# Patient Record
Sex: FEMALE | Race: WHITE
[De-identification: names, ages, dates, MRNs, and addresses within clinical notes are randomized per-mention and may not be internally consistent; named-entity substitution may affect disease eponyms.]

---

## 2019-06-08 ENCOUNTER — HOSPITAL ENCOUNTER (EMERGENCY)
Dept: HOSPITAL 91 - FTE | Age: 24
Discharge: HOME | End: 2019-06-08
Payer: MEDICAID

## 2019-06-08 ENCOUNTER — HOSPITAL ENCOUNTER (EMERGENCY)
Dept: HOSPITAL 10 - FTE | Age: 24
Discharge: HOME | End: 2019-06-08
Payer: MEDICAID

## 2019-06-08 VITALS — HEART RATE: 68 BPM | SYSTOLIC BLOOD PRESSURE: 123 MMHG | RESPIRATION RATE: 18 BRPM | DIASTOLIC BLOOD PRESSURE: 58 MMHG

## 2019-06-08 VITALS
BODY MASS INDEX: 27.48 KG/M2 | WEIGHT: 160.94 LBS | BODY MASS INDEX: 27.48 KG/M2 | HEIGHT: 64 IN | HEIGHT: 64 IN | WEIGHT: 160.94 LBS

## 2019-06-08 DIAGNOSIS — O26.891: Primary | ICD-10-CM

## 2019-06-08 DIAGNOSIS — R10.2: ICD-10-CM

## 2019-06-08 DIAGNOSIS — Z3A.01: ICD-10-CM

## 2019-06-08 LAB
ADD MAN DIFF?: NO
ADD UMIC: YES
BASOPHIL #: 0 10^3/UL (ref 0–0.1)
BASOPHILS %: 0.4 % (ref 0–2)
EOSINOPHILS #: 0.3 10^3/UL (ref 0–0.5)
EOSINOPHILS %: 3 % (ref 0–7)
HEMATOCRIT: 36.6 % (ref 37–47)
HEMOGLOBIN: 12.5 G/DL (ref 12–16)
IMMATURE GRANS #M: 0.02 10^3/UL (ref 0–0.03)
IMMATURE GRANS % (M): 0.2 % (ref 0–0.43)
LYMPHOCYTES #: 2.1 10^3/UL (ref 0.8–2.9)
LYMPHOCYTES %: 24.9 % (ref 15–51)
MEAN CORPUSCULAR HEMOGLOBIN: 30 PG (ref 29–33)
MEAN CORPUSCULAR HGB CONC: 34.2 G/DL (ref 32–37)
MEAN CORPUSCULAR VOLUME: 87.8 FL (ref 82–101)
MEAN PLATELET VOLUME: 9.9 FL (ref 7.4–10.4)
MONOCYTE #: 0.4 10^3/UL (ref 0.3–0.9)
MONOCYTES %: 4.9 % (ref 0–11)
NEUTROPHIL #: 5.7 10^3/UL (ref 1.6–7.5)
NEUTROPHILS %: 66.6 % (ref 39–77)
NUCLEATED RED BLOOD CELLS #: 0 10^3/UL (ref 0–0)
NUCLEATED RED BLOOD CELLS%: 0 /100WBC (ref 0–0)
PLATELET COUNT: 261 10^3/UL (ref 140–415)
RED BLOOD COUNT: 4.17 10^6/UL (ref 4.2–5.4)
RED CELL DISTRIBUTION WIDTH: 12.9 % (ref 11.5–14.5)
UR ASCORBIC ACID: NEGATIVE MG/DL
UR BILIRUBIN (DIP): NEGATIVE MG/DL
UR BLOOD (DIP): (no result) MG/DL
UR CLARITY: (no result)
UR COLOR: YELLOW
UR GLUCOSE (DIP): NEGATIVE MG/DL
UR KETONES (DIP): NEGATIVE MG/DL
UR LEUKOCYTE ESTERASE (DIP): NEGATIVE LEU/UL
UR NITRITE (DIP): NEGATIVE MG/DL
UR PH (DIP): 5 (ref 5–9)
UR RBC: 2 /HPF (ref 0–5)
UR SPECIFIC GRAVITY (DIP): 1.01 (ref 1–1.03)
UR SQUAMOUS EPITHELIAL CELL: (no result) /HPF
UR TOTAL PROTEIN (DIP): NEGATIVE MG/DL
UR UROBILINOGEN (DIP): NEGATIVE MG/DL
UR WBC: 1 /HPF (ref 0–5)
WHITE BLOOD COUNT: 8.6 10^3/UL (ref 4.8–10.8)

## 2019-06-08 PROCEDURE — 85025 COMPLETE CBC W/AUTO DIFF WBC: CPT

## 2019-06-08 PROCEDURE — 81025 URINE PREGNANCY TEST: CPT

## 2019-06-08 PROCEDURE — 76801 OB US < 14 WKS SINGLE FETUS: CPT

## 2019-06-08 PROCEDURE — 36415 COLL VENOUS BLD VENIPUNCTURE: CPT

## 2019-06-08 PROCEDURE — 84702 CHORIONIC GONADOTROPIN TEST: CPT

## 2019-06-08 PROCEDURE — 99284 EMERGENCY DEPT VISIT MOD MDM: CPT

## 2019-06-08 PROCEDURE — 81001 URINALYSIS AUTO W/SCOPE: CPT

## 2019-06-08 PROCEDURE — 86900 BLOOD TYPING SEROLOGIC ABO: CPT

## 2019-06-08 PROCEDURE — 86901 BLOOD TYPING SEROLOGIC RH(D): CPT

## 2019-06-08 NOTE — ERD
ER Documentation


Chief Complaint


Chief Complaint





lower abd pain x 2 days , 5 weeks preg





HPI


Patient is a 24-year-old female, no past medical history, G1, P0, presents the 


ER for concerns of pelvic pain x2 days.  Patient states her last menstrual.  Was


5-5-19.  Patient denies any vaginal bleeding.  Patient denies any fluid loss.  


She states she occasionally feels cramping.  Patient does not have an OB/GYN.





ROS


All systems reviewed and are negative except as per history of present illness.





Medications


Home Meds


Active Scripts


Acetaminophen* (Tylophen*) 500 Mg Capsule, 1 CAP PO Q6H PRN for PAIN AND OR 


ELEVATED TEMP, #20 CAP


   Prov:ARIELLE WHITE PA-C         19


Ibuprofen* (Motrin*) 800 Mg Tab, 800 MG PO Q6, #30 TAB


   Prov:MAGNO DAVILA PA-C         10/11/18


Hydrocodone Bit-Acetaminophen* (Norco*) 5-325 Mg Tab, 1 TAB PO Q6 PRN for PAIN, 


#16 TAB


   Prov:GREG BAEZ MD         9/5/15


Ondansetron Hcl* (Zofran* ODT) 8 mg -ODT Tab.disper, 8 MG PO Q6 PRN for NAUSEA 


AND/OR VOMITING, #10 TAB


   Prov:GREG BAEZ MD         9/5/15


Hydrocodone Bit-Acetaminophen* (Norco*) 5-325 Mg Tab, 1 TAB PO Q6 PRN for PAIN, 


#10 TAB


   Prov:ISRAEL SCHOFIELD         8/28/15


Ranitidine Hcl* (Zantac*) 150 Mg Tablet, 150 MG PO BID PRN for acid, #30 TAB


   Prov:JOCE IZQUIERDO         8/28/15


Electrolyte,Oral (Pedialyte) 1,000 Ml Solution, 100 ML PO Q6 PRN for dehydration


for 3 Days, ML


   Prov:JOCE IZQUIERDO         8/28/15


Ondansetron Hcl* (Zofran*) 4 Mg Tablet, 4 MG PO Q6H for NAUSEA AND/OR VOMITING, 


#30 TAB


   Prov:JOCE IZQUIERDO         8/28/15





Allergies


Allergies:  


Coded Allergies:  


     No Known Allergy (Unverified , 10/11/18)





PMhx/Soc


Medical and Surgical Hx:  pt denies Medical Hx, pt denies Surgical Hx


History of Surgery:  No


Hx Neurological Disorder:  No


Hx Respiratory Disorders:  No


Hx Cardiac Disorders:  No


Hx Psychiatric Problems:  No


Hx Miscellaneous Medical Probl:  No


Hx Alcohol Use:  No


Hx Substance Use:  No


Hx Tobacco Use:  Yes


Smoking Status:  Never smoker





Physical Exam


Vitals





Vital Signs


  Date      Temp  Pulse  Resp  B/P (MAP)   Pulse Ox  O2          O2 Flow    FiO2


Time                                                 Delivery    Rate


    19  98.1     68    18      123/58        98


     09:55                           (79)





Physical Exam


GENERAL: Well-developed, well-nourished female. Appears in no acute distress. 


HEAD: Normocephalic, atraumatic. 


EYES: Pupils are equally reactive bilaterally. EOMs grossly intact. No 


conjunctival erythema. 


ENT: Moist mucous membranes. No uvula deviation. No kissing tonsils. 


NECK: Supple. No meningismus. Normal range of motion of the neck.


LUNG: Clear to auscultation bilaterally. No rhonchi, wheezing, rales or coarse 


breath sounds. 


HEART: Regular rate and rhythm. No murmurs, rubs or gallops.


ABDOMEN: Soft, nontender, and nondistended. Positive bowel sounds in all four 


quadrants. No rebound tenderness, no guarding. (-) McBurney's point tenderness. 


No CVA tenderness. 


EXTREMITIES: Equal pulses bilaterally. No peripheral clubbing, cyanosis or 


edema. No unilateral leg swelling.


NEUROLOGIC: Alert and oriented. Moving all four extremities without any 


difficulty. Normal speech. Steady gait. 


SKIN: Normal color. Warm and dry. No rashes or lesions.


Result Diagram:  


19 1026





Results 24 hrs





Laboratory Tests


       Test
                                   19
10:26  19
10:28


       White Blood Count                       8.6 10^3/ul


       Red Blood Count                        4.17 10^6/ul


       Hemoglobin                                12.5 g/dl


       Hematocrit                                   36.6 %


       Mean Corpuscular Volume                     87.8 fl


       Mean Corpuscular Hemoglobin                 30.0 pg


       Mean Corpuscular Hemoglobin
Concent      34.2 g/dl 
  



       Red Cell Distribution Width                  12.9 %


       Platelet Count                          261 10^3/UL


       Mean Platelet Volume                         9.9 fl


       Immature Granulocytes %                     0.200 %


       Neutrophils %                                66.6 %


       Lymphocytes %                                24.9 %


       Monocytes %                                   4.9 %


       Eosinophils %                                 3.0 %


       Basophils %                                   0.4 %


       Nucleated Red Blood Cells %             0.0 /100WBC


       Immature Granulocytes #               0.020 10^3/ul


       Neutrophils #                           5.7 10^3/ul


       Lymphocytes #                           2.1 10^3/ul


       Monocytes #                             0.4 10^3/ul


       Eosinophils #                           0.3 10^3/ul


       Basophils #                             0.0 10^3/ul


       Nucleated Red Blood Cells #             0.0 10^3/ul


       Urine Color                          YELLOW


       Urine Clarity
                       SLIGHTLY
CLOUDY  



       Urine pH                                        5.0


       Urine Specific Gravity                        1.013


       Urine Ketones                        NEGATIVE mg/dL


       Urine Nitrite                        NEGATIVE mg/dL


       Urine Bilirubin                      NEGATIVE mg/dL


       Urine Urobilinogen                   NEGATIVE mg/dL


       Urine Leukocyte Esterase
            NEGATIVE
Brianna/ul  



       Urine Microscopic RBC                        2 /HPF


       Urine Microscopic WBC                        1 /HPF


       Urine Squamous Epithelial
Cells      FEW /HPF 
       



       Urine Hemoglobin                           1+ mg/dL


       Urine Glucose                        NEGATIVE mg/dL


       Urine Total Protein                  NEGATIVE mg/dl


       Beta HCG, Quantitative                1991.4 mIU/ml


       POC Beta HCG, Qualitative                             POSITIVE








Procedures/MDM


ED COURSE:


The patient was stable throughout ED course. I kept the patient and/or family 


informed of laboratory and diagnostic imaging results throughout the ED course. 











DIAGNOSTIC IMAGING:


Read by radiologist.


Patient: DARIN DOLAN   : 1995   Age: 24  Sex: F                      


 


MR #:    [id number]   Acct #:   [id number]    DOS: 19 1015


Ordering MD: ARIELLE WHITE PA-C   Location:  [facility]   Room/Bed:                   


                        





PROCEDURE:   First trimester obstetrical ultrasound. 


CLINICAL INDICATION:   Pregnancy.  Evaluate size and dates. 


TECHNIQUE:   Transabdominal gray scale and color Doppler ultrasound of the 


uterus.  Transvaginal imaging was not performed.


COMPARISON:   None available  


FINDINGS: 


Intrauterine pregnancy:    Present


Ectopic pregnancy:   None


Mean sac diameter:     0.38 cm


Yolk sac diameter:      Not yet visualized 


Crown-rump length:    Not yet visualized 


Subchorionic bleed:     None


Cervix:      Unremarkable.


Ovaries:       Normal right ovary. Left ovary not well visualized.


Free fluid:       None.


 


IMPRESSION: 


 


1.     Possible tiny early intrauterine gestational sac.


2.     Estimated gestational age of 5 weeks 0 days by ultrasound criteria, 


assuming true intrauterine gestational sac.


3.     Estimated date of delivery of 2020.


4.     No subchorionic hemorrhage.


5.     Too early for visualization of the yolk sac or embryonic pole.


 


 


RPTAT: PP


_____________________________________________ 


.Jorje Bynum MD, MD           Date    Time 


Electronically viewed and signed by .Jorje Bynum MD, MD on 2019 11:18 


 


D:  2019 11:18  T:  2019 11:18


.B/





CC: ARIELLE WHITE PA-C





[phone]





 MEDICAL DECISION MAKING:


This is a 21-year-old female, G1, P0, presents the ER for concerns of pelvic yohana


n x2 days.  Patient denies any vaginal bleeding.. Vital signs were reviewed. 


Patient was afebrile. Patient was hemodynamically stable. Urine pregnancy test 


was positive. 





Patient's beta-hCG was noted to be 1991.





CBC showed no evidence of systemic infection or severe anemia. 


Pelvic US showed 


1.     Possible tiny early intrauterine gestational sac.


2.     Estimated gestational age of 5 weeks 0 days by ultrasound criteria, 


assuming true intrauterine gestational sac.


3.     Estimated date of delivery of 2020.


4.     No subchorionic hemorrhage.


5.     Too early for visualization of the yolk sac or embryonic pole.


 


Patient's blood type was noted to be a positive.





Given that yolk sac was not visualized, she was advised that she will need 


repeat beta-hCG and ultrasound in 2 days.  Patient advised return to the ER for 


any new or worsening symptoms but not limited to bleeding.








Differential diagnosis included was not limited to UTI, pyelonephritis, 


nephrolithiasis, ectopic pregnancy, ruptured ectopic pregnancy, molar pregnancy,


subchorionic hematoma, spontaneous , incomplete , complete 


, missed , placental abruption, placental previa, vasa previa, 


uterine rupture, anembyronic pregnancy.  Patient was nontoxic, non-ill-appearing


prior to discharge.





DISCHARGE:


At this time, patient is stable for discharge and outpatient management. I have 


instructed the patient to follow-up with her OBGYN in 1-2 days for further 


monitoring including a repeat b-HCG level. I have instructed the patient to 


promptly return to the ER at any time for any new or worsening symptoms 


including increased pain, nausea, vomiting, continued bleeding, weakness, 


syncope or fever. The patient and/or family expressed understanding of and 


agreement with this plan. All questions were answered. Home care instructions 


were provided.








Disclaimer: Inadvertent spelling and grammatical errors are likely due to 


EHR/dictation software use and do not reflect on the overall quality of patient 


care. Also, please note that the electronic time recorded on this note does not 


necessarily reflect the actual time of the patient encounter.





Departure


Diagnosis:  


   Primary Impression:  


   Pelvic pain complicating pregnancy


Condition:  Fair


Patient Instructions:  Pelvic Pain In Pregnancy: Unclear (2-3 Trimester)


Referrals:  


[facility]


YOU HAVE RECEIVED A MEDICAL SCREENING EXAM AND THE RESULTS INDICATE THAT YOU DO 


NOT HAVE A CONDITION THAT REQUIRES URGENT TREATMENT IN THE EMERGENCY DEPARTMENT.





FURTHER EVALUATION AND TREATMENT OF YOUR CONDITION CAN WAIT UNTIL YOU ARE SEEN 


IN YOUR DOCTORS OFFICE WITHIN THE NEXT 1-2 DAYS. IT IS YOUR RESPONSIBILITY TO 


MAKE AN APPOINTMENT FOR FOLOW-UP CARE.





IF YOU HAVE A PRIMARY DOCTOR


--you should call your primary doctor and schedule an appointment





IF YOU DO NOT HAVE A PRIMARY DOCTOR YOU CAN CALL OUR PHYSICIAN REFERRAL HOTLINE 


AT


 [phone] 





IF YOU CAN NOT AFFORD TO SEE A PHYSICIAN YOU CAN CHOSE FROM THE FOLLOWING 


[facility] [phone] [address] [facility] [phone] [facility]. [facility] [phone] [address] [facility] [phone] [facility] [phone] [facility]. [facility]. [phone]


[address]. [facility]


YOU HAVE RECEIVED A MEDICAL SCREENING EXAM AND THE RESULTS INDICATE THAT YOU DO 


NOT HAVE A CONDITION THAT REQUIRES URGENT TREATMENT IN THE EMERGENCY DEPARTMENT.





FURTHER EVALUATION AND TREATMENT OF YOUR CONDITION CAN WAIT UNTIL YOU ARE SEEN 


IN YOUR DOCTORS OFFICE WITHIN THE NEXT 1-2 DAYS. IT IS YOUR RESPONSIBILITY TO 


MAKE AN APPOINTMENT FOR FOLOW-UP CARE.





IF YOU HAVE A PRIMARY DOCTOR


--you should call your primary doctor and schedule and appointment





IF YOU DO NOT HAVE A PRIMARY DOCTOR YOU CAN CALL OUR PHYSICIAN REFERRAL HOTLINE 


AT [phone].





IF YOU CAN NOT AFFORD TO SEE A PHYSICIAN YOU CAN CHOSE FROM THE FOLLOWING [facility]


INSTITUTIONS:





[facility]


[address] [place] [address]





[facility]


[address] [place] [address]





[facility]


[address] [place] [address]





Additional Instructions:  


Regresar en dos mena para recheck.





Llame al doctor MAANA y phong lexy JANAY PARA DENTRO DE 1-2 MENA.Dgale a la 


secretaria que nosotros le instruimos hacer esta janay.Avise o llame si laughlin 


condicin se empeora antes de la janay. Regresa aqui si peor o no mejor.











ARIELLE WHITE PA-C              2019 15:54